# Patient Record
Sex: FEMALE | Race: ASIAN | NOT HISPANIC OR LATINO
[De-identification: names, ages, dates, MRNs, and addresses within clinical notes are randomized per-mention and may not be internally consistent; named-entity substitution may affect disease eponyms.]

---

## 2018-04-24 ENCOUNTER — APPOINTMENT (OUTPATIENT)
Dept: UROLOGY | Facility: CLINIC | Age: 38
End: 2018-04-24
Payer: MEDICAID

## 2018-04-24 VITALS
RESPIRATION RATE: 17 BRPM | DIASTOLIC BLOOD PRESSURE: 77 MMHG | WEIGHT: 132 LBS | OXYGEN SATURATION: 96 % | TEMPERATURE: 98.1 F | HEART RATE: 84 BPM | BODY MASS INDEX: 23.99 KG/M2 | SYSTOLIC BLOOD PRESSURE: 112 MMHG

## 2018-04-24 PROCEDURE — 99213 OFFICE O/P EST LOW 20 MIN: CPT

## 2018-04-25 LAB
APPEARANCE: CLEAR
BACTERIA: NEGATIVE
BILIRUBIN URINE: NEGATIVE
BLOOD URINE: ABNORMAL
COLOR: YELLOW
CORE LAB FLUID CYTOLOGY: NORMAL
GLUCOSE QUALITATIVE U: NEGATIVE MG/DL
KETONES URINE: NEGATIVE
LEUKOCYTE ESTERASE URINE: NEGATIVE
MICROSCOPIC-UA: NORMAL
NITRITE URINE: NEGATIVE
PH URINE: 7
PROTEIN URINE: NEGATIVE MG/DL
RED BLOOD CELLS URINE: 2 /HPF
SPECIFIC GRAVITY URINE: 1.01
SQUAMOUS EPITHELIAL CELLS: 1 /HPF
UROBILINOGEN URINE: NEGATIVE MG/DL
WHITE BLOOD CELLS URINE: 1 /HPF

## 2018-04-26 LAB — BACTERIA UR CULT: NORMAL

## 2019-05-28 ENCOUNTER — APPOINTMENT (OUTPATIENT)
Dept: UROLOGY | Facility: CLINIC | Age: 39
End: 2019-05-28
Payer: MEDICAID

## 2019-05-28 VITALS
OXYGEN SATURATION: 96 % | DIASTOLIC BLOOD PRESSURE: 84 MMHG | RESPIRATION RATE: 17 BRPM | SYSTOLIC BLOOD PRESSURE: 117 MMHG | TEMPERATURE: 97.6 F | BODY MASS INDEX: 23.81 KG/M2 | WEIGHT: 131 LBS | HEART RATE: 81 BPM

## 2019-05-28 PROCEDURE — 99213 OFFICE O/P EST LOW 20 MIN: CPT

## 2019-05-28 NOTE — LETTER BODY
[FreeTextEntry1] : Linwood Piedra M.D.\par [address] [place] [address]\[place] [address]\par [phone] \par [phone]\par \par Dear Dr. Piedra,\par \par Reason for visit: Abnormal urinalysis. Pyruia. \par \par This is an 38 year-old woman with previous hematuria and recent abnormal urinalysis. She was last seen by me over 1 year ago. Patient had a negative cystoscopy and upper tract evaluation 8 years ago. Her current urinalysis demonstrated pyuria. She is asymptomatic. She is currently on antibiotics for URI. She returns today for follow up. Her test report last year demonstrated evidence of hematuria on urinary dipstick. However on microscopy there is no red cells seen. She denies any gross hematuria, dysuria, or urinary difficulties. The patient denies any aggravating or relieving factors. The patient denies any interference of function. The patient is entirely asymptomatic. All other review of systems are negative. Past medical history was inquired and was noncontributory. Medications and allergies were reviewed.\par \par On examination, the patient is a healthy-appearing woman in no acute distress. She is alert and oriented follows commands. She has normal mood and affect. She is normocephalic. Neck is supple. Respirations are unlabored. Abdomen is soft and nontender. Liver is not palpable. Bladder is nonpalpable. No CVA tenderness. Neurologically she is grossly intact. No peripheral edema. Skin without gross abnormality.\par \par Assessment: Abnormal urinalysis, possible pyuria.\par \par I counseled the patient on the various etiologies of the microscopic hematuria and pyuria. . She previously underwent a negative hematuria evaluation. I discussed the risk of occult malignancy. Given the absence of red blood cells on microscopy, the urine dipstick may represent a false positive result. I recommended the patient repeat the urinalysis, culture today to further evaluate. Risks and alternatives were discussed. I answered the patient questions. The patient will follow-up as directed and will contact me with any questions or concerns. Thank you for the opportunity to participate in the care of Ms. MORTON. I will keep you updated on her progress. \par \par Plan: Urinalysis, culture. Follow up in one year. \par

## 2019-06-19 LAB
APPEARANCE: CLEAR
BACTERIA UR CULT: NORMAL
BACTERIA: NEGATIVE
BILIRUBIN URINE: NEGATIVE
BLOOD URINE: NORMAL
C TRACH RRNA SPEC QL NAA+PROBE: NOT DETECTED
COLOR: COLORLESS
GLUCOSE QUALITATIVE U: NEGATIVE
HYALINE CASTS: 0 /LPF
KETONES URINE: NEGATIVE
LEUKOCYTE ESTERASE URINE: ABNORMAL
MICROSCOPIC-UA: NORMAL
N GONORRHOEA RRNA SPEC QL NAA+PROBE: NOT DETECTED
NITRITE URINE: NEGATIVE
PH URINE: 6.5
PROTEIN URINE: NEGATIVE
RED BLOOD CELLS URINE: 17 /HPF
SOURCE AMPLIFICATION: NORMAL
SPECIFIC GRAVITY URINE: 1.01
SQUAMOUS EPITHELIAL CELLS: 2 /HPF
UROBILINOGEN URINE: NORMAL
WHITE BLOOD CELLS URINE: 4 /HPF

## 2019-06-25 ENCOUNTER — APPOINTMENT (OUTPATIENT)
Dept: UROLOGY | Facility: CLINIC | Age: 39
End: 2019-06-25
Payer: MEDICAID

## 2019-06-25 VITALS
RESPIRATION RATE: 17 BRPM | HEART RATE: 80 BPM | OXYGEN SATURATION: 99 % | TEMPERATURE: 97.6 F | SYSTOLIC BLOOD PRESSURE: 119 MMHG | DIASTOLIC BLOOD PRESSURE: 80 MMHG

## 2019-06-25 PROCEDURE — 52000 CYSTOURETHROSCOPY: CPT

## 2019-06-25 PROCEDURE — 76775 US EXAM ABDO BACK WALL LIM: CPT

## 2019-06-25 RX ORDER — CIPROFLOXACIN HYDROCHLORIDE 500 MG/1
500 TABLET, FILM COATED ORAL
Qty: 2 | Refills: 0 | Status: ACTIVE | COMMUNITY
Start: 2019-06-25

## 2019-06-25 RX ORDER — CIPROFLOXACIN HYDROCHLORIDE 500 MG/1
500 TABLET, FILM COATED ORAL
Refills: 0 | Status: COMPLETED | OUTPATIENT
Start: 2019-06-25

## 2019-06-25 RX ADMIN — CIPROFLOXACIN HYDROCHLORIDE 0 MG: 500 TABLET, FILM COATED ORAL at 00:00

## 2019-06-26 LAB — URINE CYTOLOGY: NORMAL

## 2019-06-28 LAB
MYCOPLASMA HOMINIS CULTURE: NORMAL
UREA SPECIMEN SOURCE: NORMAL
UREAPLASMA CULTURE: NORMAL
UREAPLASMA, PRELIMINARY CULTURE: NORMAL

## 2020-01-07 ENCOUNTER — APPOINTMENT (OUTPATIENT)
Dept: UROLOGY | Facility: CLINIC | Age: 40
End: 2020-01-07

## 2021-09-07 ENCOUNTER — APPOINTMENT (OUTPATIENT)
Dept: UROLOGY | Facility: CLINIC | Age: 41
End: 2021-09-07

## 2022-07-15 ENCOUNTER — APPOINTMENT (OUTPATIENT)
Dept: UROLOGY | Facility: CLINIC | Age: 42
End: 2022-07-15

## 2022-07-15 VITALS
WEIGHT: 129 LBS | HEIGHT: 62 IN | SYSTOLIC BLOOD PRESSURE: 114 MMHG | OXYGEN SATURATION: 95 % | HEART RATE: 86 BPM | TEMPERATURE: 97.9 F | DIASTOLIC BLOOD PRESSURE: 76 MMHG | BODY MASS INDEX: 23.74 KG/M2 | RESPIRATION RATE: 17 BRPM

## 2022-07-15 DIAGNOSIS — R82.90 UNSPECIFIED ABNORMAL FINDINGS IN URINE: ICD-10-CM

## 2022-07-15 DIAGNOSIS — R31.29 OTHER MICROSCOPIC HEMATURIA: ICD-10-CM

## 2022-07-15 DIAGNOSIS — Z00.00 ENCOUNTER FOR GENERAL ADULT MEDICAL EXAMINATION W/OUT ABNORMAL FINDINGS: ICD-10-CM

## 2022-07-15 PROCEDURE — 99203 OFFICE O/P NEW LOW 30 MIN: CPT

## 2022-07-15 NOTE — LETTER BODY
[FreeTextEntry1] : Linwood Piedra M.D.\par [address] [place] [address]\[place] [address]\par [phone] \par [phone]\par \par Dear Dr. Piedra,\par  \par Reason for visit: Abnormal urinalysis. Possible microscopic hematuria\par \par This is an 41 year year-old woman who was found to have abnormal urinalysis. Her test report demonstrated evidence of hematuria on urinary dipstick. However, on microscopy there were no red cells seen.patient having a negative hematuria evaluation in 2011 and 2019.  The patient denies any aggravating or relieving factors. The patient denies any interference of function. The patient is entirely asymptomatic.All other review of systems are negative. Past medical history was inquired and was noncontributory. Medications and allergies were reviewed.\par \par On examination, the patient is a healthy-appearing woman in no acute distress. She is alert and oriented follows commands. She  has normal mood and affect. She is normocephalic. Neck is supple. Respirations are unlabored. Abdomen is soft and nontender. Liver is not palpable. Bladder is nonpalpable. No CVA tenderness. Neurologically she is grossly intact. No peripheral edema. Skin without gross abnormality.\par \par Assessment: Abnormal urinalysis, possible microscopic hematuria.\par \par I counseled the patient on the various etiologies of the microscopic hematuria. I discussed the risk of occult malignancy. Given the absence of red blood cells on microscopy, the urine dipstick may represent a false positive result. I recommended the patient repeat the urinalysis and obtain a urine cytology prior to proceeding with hematuria evaluation.  I answered the patient's questions. The risks and expected outcomes were discussed. The patient will follow up as directed and contact me with any questions or concerns. \par \par Plan: Repeat urinalysis, and obtain urine cytology. Followup in 1 year\par \par

## 2022-07-15 NOTE — LETTER BODY
[FreeTextEntry1] : Linwood Piedra M.D.\par [address] [place] [address]\[place] [address]\par [phone] \par [phone]\par \par Dear Dr. Piedra,\par  \par Reason for visit: Abnormal urinalysis. Possible microscopic hematuria\par \par This is an 41 year year-old woman who was found to have abnormal urinalysis. Her test report demonstrated evidence of hematuria on urinary dipstick. However, on microscopy there were no red cells seen.patient having a negative hematuria evaluation in 2011 and 2019.  The patient denies any aggravating or relieving factors. The patient denies any interference of function. The patient is entirely asymptomatic.All other review of systems are negative. Past medical history was inquired and was noncontributory. Medications and allergies were reviewed.\par \par On examination, the patient is a healthy-appearing woman in no acute distress. She is alert and oriented follows commands. She  has normal mood and affect. She is normocephalic. Neck is supple. Respirations are unlabored. Abdomen is soft and nontender. Liver is not palpable. Bladder is nonpalpable. No CVA tenderness. Neurologically she is grossly intact. No peripheral edema. Skin without gross abnormality.\par \par Assessment: Abnormal urinalysis, possible microscopic hematuria.\par \par I counseled the patient on the various etiologies of the microscopic hematuria. I discussed the risk of occult malignancy. Given the absence of red blood cells on microscopy, the urine dipstick may represent a false positive result. I recommended the patient repeat the urinalysis and obtain a urine cytology prior to proceeding with hematuria evaluation.  I answered the patient's questions. The risks and expected outcomes were discussed. The patient will follow up as directed and contact me with any questions or concerns. \par \par Plan: Repeat urinalysis, and obtain urine cytology. Followup in 1 year\par \par

## 2022-07-16 LAB
APPEARANCE: CLEAR
BACTERIA: NEGATIVE
BILIRUBIN URINE: NEGATIVE
BLOOD URINE: NEGATIVE
COLOR: NORMAL
GLUCOSE QUALITATIVE U: NEGATIVE
HYALINE CASTS: 0 /LPF
KETONES URINE: NEGATIVE
LEUKOCYTE ESTERASE URINE: NEGATIVE
MICROSCOPIC-UA: NORMAL
NITRITE URINE: NEGATIVE
PH URINE: 6
PROTEIN URINE: NORMAL
RED BLOOD CELLS URINE: 3 /HPF
SPECIFIC GRAVITY URINE: 1.02
SQUAMOUS EPITHELIAL CELLS: 3 /HPF
UROBILINOGEN URINE: NORMAL
WHITE BLOOD CELLS URINE: 1 /HPF

## 2022-07-20 LAB — URINE CYTOLOGY: NORMAL

## 2024-09-23 ENCOUNTER — NON-APPOINTMENT (OUTPATIENT)
Age: 44
End: 2024-09-23

## 2024-09-24 ENCOUNTER — APPOINTMENT (OUTPATIENT)
Dept: UROLOGY | Facility: CLINIC | Age: 44
End: 2024-09-24
Payer: MEDICAID

## 2024-09-24 ENCOUNTER — TRANSCRIPTION ENCOUNTER (OUTPATIENT)
Age: 44
End: 2024-09-24

## 2024-09-24 VITALS
WEIGHT: 143.6 LBS | OXYGEN SATURATION: 98 % | TEMPERATURE: 97.7 F | HEART RATE: 85 BPM | SYSTOLIC BLOOD PRESSURE: 172 MMHG | DIASTOLIC BLOOD PRESSURE: 116 MMHG | BODY MASS INDEX: 26.26 KG/M2 | RESPIRATION RATE: 18 BRPM

## 2024-09-24 DIAGNOSIS — R82.90 UNSPECIFIED ABNORMAL FINDINGS IN URINE: ICD-10-CM

## 2024-09-24 DIAGNOSIS — R31.29 OTHER MICROSCOPIC HEMATURIA: ICD-10-CM

## 2024-09-24 PROCEDURE — 99213 OFFICE O/P EST LOW 20 MIN: CPT

## 2024-09-24 PROCEDURE — G2211 COMPLEX E/M VISIT ADD ON: CPT | Mod: NC

## 2024-09-24 NOTE — LETTER BODY
[FreeTextEntry1] : Linwood Piedra M.D.  [address] [place] [address]  [phone]    Dear Dr. Piedra,    Reason for visit: Abnormal urinalysis. Possible microscopic hematuria    This is a 43-year year-old woman who was found to have abnormal urinalysis. Her test report demonstrated evidence of hematuria on urinary dipstick. However, on microscopy there were no red cells seen. Patient having a negative hematuria evaluation in 2011, 2019, and 2022. Patient returns today for follow-up. Patient was last seen 2 years ago. Since her last visit, the patient denied any gross hematuria, dysuria, or any urinary symptoms. The patient denies any aggravating or relieving factors. The patient denies any interference of function. The patient is entirely asymptomatic. All other review of systems are negative. Past medical history was inquired and was noncontributory. Medications and allergies were reviewed.    On examination, the patient is a healthy-appearing woman in no acute distress. She is alert and oriented follows commands. She has normal mood and affect. She is normocephalic. Neck is supple. Respirations are unlabored. Abdomen is soft and nontender. Liver is not palpable. Bladder is nonpalpable. No CVA tenderness. Neurologically she is grossly intact. No peripheral edema. Skin without gross abnormality.    Assessment: Abnormal urinalysis, possible microscopic hematuria.    I counseled the patient. I discussed the risk of occult malignancy. I recommended the patient repeat urinalysis and obtain urine cytology prior to proceeding with hematuria evaluation. I answered the patient's questions. The risks and expected outcomes were discussed. The patient will follow up as directed and contact me with any questions or concerns. Thank you for the opportunity to participate in the care of this patient, I will keep you updated on her progress.    Plan: Repeat urinalysis, and obtain urine cytology. Followup in 1 year

## 2024-09-24 NOTE — LETTER BODY
[FreeTextEntry1] : Linwood Piedra M.D.  [address] [place] [address]  [phone]    Dear Dr. Piedra,    Reason for visit: Abnormal urinalysis. Possible microscopic hematuria    This is a 43-year year-old woman who was found to have abnormal urinalysis. Her test report demonstrated evidence of hematuria on urinary dipstick. However, on microscopy there were no red cells seen. Patient having a negative hematuria evaluation in 2011, 2019, and 2022. Patient returns today for follow-up. Patient was last seen 2 years ago. Since her last visit, the patient denied any gross hematuria, dysuria, or any urinary symptoms. The patient denies any aggravating or relieving factors. The patient denies any interference of function. The patient is entirely asymptomatic. All other review of systems are negative. Past medical history was inquired and was noncontributory. Medications and allergies were reviewed.    On examination, the patient is a healthy-appearing woman in no acute distress. She is alert and oriented follows commands. She has normal mood and affect. She is normocephalic. Neck is supple. Respirations are unlabored. Abdomen is soft and nontender. Liver is not palpable. Bladder is nonpalpable. No CVA tenderness. Neurologically she is grossly intact. No peripheral edema. Skin without gross abnormality.    Assessment: Abnormal urinalysis, possible microscopic hematuria.    I counseled the patient. I discussed the risk of occult malignancy. I recommended the patient repeat urinalysis and obtain urine cytology prior to proceeding with hematuria evaluation. I answered the patient's questions. The risks and expected outcomes were discussed. The patient will follow up as directed and contact me with any questions or concerns. Thank you for the opportunity to participate in the care of this patient, I will keep you updated on her progress.    Plan: Repeat urinalysis, and obtain urine cytology. Followup in 1 year

## 2024-09-24 NOTE — ADDENDUM
[FreeTextEntry1] : Entered by Jeff Knox, acting as scribe for Dr. Roland Garner. The documentation recorded by the scribe accurately reflects the service I personally performed and the decisions made by me.

## 2024-09-25 LAB
APPEARANCE: CLEAR
BACTERIA: NEGATIVE /HPF
BILIRUBIN URINE: NEGATIVE
BLOOD URINE: ABNORMAL
CAST: 0 /LPF
COLOR: YELLOW
EPITHELIAL CELLS: 2 /HPF
GLUCOSE QUALITATIVE U: NEGATIVE MG/DL
KETONES URINE: NEGATIVE MG/DL
LEUKOCYTE ESTERASE URINE: NEGATIVE
MICROSCOPIC-UA: NORMAL
NITRITE URINE: NEGATIVE
PH URINE: 8
PROTEIN URINE: 30 MG/DL
RED BLOOD CELLS URINE: 2 /HPF
REVIEW: NORMAL
SPECIFIC GRAVITY URINE: 1.01
URINE CYTOLOGY: NORMAL
UROBILINOGEN URINE: 0.2 MG/DL
WHITE BLOOD CELLS URINE: 0 /HPF